# Patient Record
Sex: FEMALE | Race: WHITE | NOT HISPANIC OR LATINO | URBAN - METROPOLITAN AREA
[De-identification: names, ages, dates, MRNs, and addresses within clinical notes are randomized per-mention and may not be internally consistent; named-entity substitution may affect disease eponyms.]

---

## 2024-02-16 VITALS
OXYGEN SATURATION: 99 % | TEMPERATURE: 96 F | SYSTOLIC BLOOD PRESSURE: 123 MMHG | HEIGHT: 63 IN | WEIGHT: 176.37 LBS | DIASTOLIC BLOOD PRESSURE: 82 MMHG | RESPIRATION RATE: 8 BRPM | HEART RATE: 841 BPM

## 2024-02-16 RX ORDER — LIRAGLUTIDE 6 MG/ML
1.2 INJECTION SUBCUTANEOUS
Refills: 0 | DISCHARGE

## 2024-02-16 NOTE — ASU PATIENT PROFILE, ADULT - NSICDXPASTMEDICALHX_GEN_ALL_CORE_FT
PAST MEDICAL HISTORY:  2019 novel coronavirus disease (COVID-19)     Fibromyalgia     Pulmonary embolism 2013     PAST MEDICAL HISTORY:  2019 novel coronavirus disease (COVID-19)     Fibromyalgia     Pre-diabetes     Pulmonary embolism 2013

## 2024-02-17 ENCOUNTER — OUTPATIENT (OUTPATIENT)
Dept: OUTPATIENT SERVICES | Facility: HOSPITAL | Age: 49
LOS: 1 days | Discharge: HOME | End: 2024-02-17
Payer: COMMERCIAL

## 2024-02-17 VITALS
SYSTOLIC BLOOD PRESSURE: 133 MMHG | RESPIRATION RATE: 19 BRPM | HEART RATE: 65 BPM | OXYGEN SATURATION: 100 % | DIASTOLIC BLOOD PRESSURE: 82 MMHG

## 2024-02-17 LAB — GLUCOSE BLDC GLUCOMTR-MCNC: 101 MG/DL — HIGH (ref 70–99)

## 2024-02-17 PROCEDURE — 88305 TISSUE EXAM BY PATHOLOGIST: CPT

## 2024-02-17 PROCEDURE — 58558 HYSTEROSCOPY BIOPSY: CPT

## 2024-02-17 PROCEDURE — 88305 TISSUE EXAM BY PATHOLOGIST: CPT | Mod: 26

## 2024-02-17 PROCEDURE — 82962 GLUCOSE BLOOD TEST: CPT

## 2024-02-17 DEVICE — MYOSURE TISSUE REMOVAL DEVICE REACH: Type: IMPLANTABLE DEVICE | Status: FUNCTIONAL

## 2024-02-17 DEVICE — MYOSURE TISSUE REMOVAL DEVICE XL: Type: IMPLANTABLE DEVICE | Status: FUNCTIONAL

## 2024-02-17 RX ORDER — SIMETHICONE 80 MG/1
80 TABLET, CHEWABLE ORAL EVERY 6 HOURS
Refills: 0 | Status: DISCONTINUED | OUTPATIENT
Start: 2024-02-17 | End: 2024-02-17

## 2024-02-17 RX ORDER — KETOROLAC TROMETHAMINE 30 MG/ML
30 SYRINGE (ML) INJECTION ONCE
Refills: 0 | Status: DISCONTINUED | OUTPATIENT
Start: 2024-02-17 | End: 2024-02-17

## 2024-02-17 RX ORDER — ACETAMINOPHEN 500 MG
1000 TABLET ORAL EVERY 6 HOURS
Refills: 0 | Status: DISCONTINUED | OUTPATIENT
Start: 2024-02-17 | End: 2024-02-17

## 2024-02-17 RX ORDER — SODIUM CHLORIDE 9 MG/ML
500 INJECTION, SOLUTION INTRAVENOUS
Refills: 0 | Status: DISCONTINUED | OUTPATIENT
Start: 2024-02-17 | End: 2024-02-17

## 2024-02-17 RX ORDER — SODIUM CHLORIDE 9 MG/ML
1000 INJECTION, SOLUTION INTRAVENOUS
Refills: 0 | Status: DISCONTINUED | OUTPATIENT
Start: 2024-02-17 | End: 2024-02-17

## 2024-02-17 RX ORDER — ACETAMINOPHEN 500 MG
1000 TABLET ORAL ONCE
Refills: 0 | Status: COMPLETED | OUTPATIENT
Start: 2024-02-17 | End: 2024-02-17

## 2024-02-17 RX ORDER — IBUPROFEN 200 MG
1 TABLET ORAL
Refills: 0 | DISCHARGE

## 2024-02-17 RX ORDER — ONDANSETRON 8 MG/1
8 TABLET, FILM COATED ORAL EVERY 8 HOURS
Refills: 0 | Status: DISCONTINUED | OUTPATIENT
Start: 2024-02-17 | End: 2024-02-17

## 2024-02-17 RX ADMIN — Medication 400 MILLIGRAM(S): at 13:19

## 2024-02-17 RX ADMIN — SIMETHICONE 80 MILLIGRAM(S): 80 TABLET, CHEWABLE ORAL at 13:19

## 2024-02-17 RX ADMIN — Medication 30 MILLIGRAM(S): at 13:39

## 2024-02-17 RX ADMIN — ONDANSETRON 8 MILLIGRAM(S): 8 TABLET, FILM COATED ORAL at 13:18

## 2024-02-17 RX ADMIN — Medication 1000 MILLIGRAM(S): at 13:32

## 2024-02-17 NOTE — ASU DISCHARGE PLAN (ADULT/PEDIATRIC) - NS MD DC FALL RISK RISK
For information on Fall & Injury Prevention, visit: [email or web address] OR  [email or web address] OR  https://www.cdc.gov/steadi/patient.html

## 2024-02-17 NOTE — ASU DISCHARGE PLAN (ADULT/PEDIATRIC) - CARE PROVIDER_API CALL
Jackie Arauz  Obstetrics and Gynecology  [address], [address]  [place] [address]  Phone: [phone]  Fax: [phone]  Follow Up Time:

## 2024-02-17 NOTE — PRE-ANESTHESIA EVALUATION ADULT - NSANTHPMHFT_GEN_ALL_CORE
stopped Victoza 2 days ago. POCUS gastric revealed no fluid or solid in supine position and no solid in RLD position

## 2024-02-20 LAB — SURGICAL PATHOLOGY STUDY: SIGNIFICANT CHANGE UP

## 2025-03-26 NOTE — ASU DISCHARGE PLAN (ADULT/PEDIATRIC) - PLEASE INDICATE TEMPERATURE IN FAHRENHEIT OR CELSIUS
Encounter Date: 3/25/2025       History     Chief Complaint   Patient presents with    Cough     Pt c/o cough and pain with deep breathing since Last Friday. Hx of Asthma. No distress      Patient is an 9-year-old female with a past medical history of asthma who presents to the emergency department with complaints of cough, nasal congestion x5 days.  Patient is present with her older sister who has similar symptoms.  She has been taking over-the-counter Tylenol Motrin to alleviate her symptoms.  She denies fever, chills, chest pain, shortness of breath, nausea, vomiting or diarrhea.        Review of patient's allergies indicates:  No Known Allergies  History reviewed. No pertinent past medical history.  History reviewed. No pertinent surgical history.  Family History   Problem Relation Name Age of Onset    Asthma Mother Alexia Burns         Copied from mother's history at birth     Social History[1]  Review of Systems   Constitutional:  Negative for fever.   HENT:  Positive for congestion. Negative for sore throat.    Respiratory:  Positive for cough. Negative for shortness of breath.    Cardiovascular:  Negative for chest pain.   Gastrointestinal:  Negative for nausea.   Genitourinary:  Negative for dysuria.   Musculoskeletal:  Negative for back pain.   Skin:  Negative for rash.   Neurological:  Negative for weakness.   Hematological:  Does not bruise/bleed easily.       Physical Exam     Initial Vitals [03/25/25 1809]   BP Pulse Resp Temp SpO2   (!) 134/79 (!) 114 20 99.6 °F (37.6 °C) 98 %      MAP       --         Physical Exam    Nursing note and vitals reviewed.  Constitutional: She appears well-developed and well-nourished. She is not diaphoretic. She does not appear ill. No distress.   HENT:   Head: Normocephalic and atraumatic. There is normal jaw occlusion.   Right Ear: Tympanic membrane, external ear, pinna and canal normal.   Left Ear: Tympanic membrane, external ear, pinna and canal normal.   Nose:  Rhinorrhea and congestion present. No nasal discharge. Mouth/Throat: Mucous membranes are moist. Tongue is normal. Dentition is normal. No dental caries. Pharynx erythema present. No oropharyngeal exudate or pharynx swelling. Tonsils are 0 on the right. Tonsils are 0 on the left. No tonsillar exudate.   Postnasal drip noted.  No trismus.  Normal jaw occlusion.  No evidence of tonsillar abscess.  No submandibular sublingual erythema or swelling. Oral airway is symmetric.  Uvula is midline without erythema or swelling.  Patient tolerating oral secretions.  No muffled/hoarse voice.     Eyes: Conjunctivae, EOM and lids are normal. Visual tracking is normal. Pupils are equal, round, and reactive to light. Right eye exhibits no discharge. Left eye exhibits no discharge.   Neck: Neck supple. No tracheal tenderness present. No tenderness is present. No Brudzinski's sign noted.   Normal range of motion.   Full passive range of motion without pain.     Cardiovascular:  Normal rate, regular rhythm, S1 normal and S2 normal.        Pulses are strong.    No murmur heard.  Pulmonary/Chest: Effort normal and breath sounds normal. No accessory muscle usage, nasal flaring or stridor. No respiratory distress. Air movement is not decreased. She has no wheezes. She has no rhonchi. She has no rales. She exhibits no retraction.   Abdominal: Abdomen is soft. Bowel sounds are normal. She exhibits no distension. There is no abdominal tenderness. There is no rigidity, no rebound and no guarding.   Musculoskeletal:         General: No tenderness, deformity, signs of injury or edema.      Cervical back: Normal, full passive range of motion without pain, normal range of motion and neck supple. No rigidity. No pain with movement, spinous process tenderness or muscular tenderness. Normal range of motion.      Thoracic back: Normal.      Lumbar back: Normal.      Right lower leg: Normal.      Left lower leg: Normal.     Lymphadenopathy: No anterior  cervical adenopathy, posterior cervical adenopathy, anterior occipital adenopathy or posterior occipital adenopathy. No occipital adenopathy is present.     She has no cervical adenopathy.   Neurological: She is alert and oriented for age.   Skin: Skin is warm and dry. Capillary refill takes less than 2 seconds. No rash noted.   Psychiatric: She has a normal mood and affect. Her speech is normal and behavior is normal.         ED Course   Procedures  Labs Reviewed   POCT URINE PREGNANCY       Result Value    POC Preg Test, Ur Negative       Acceptable Yes            Imaging Results    None          Medications - No data to display  Medical Decision Making  Patient is a well-appearing 9 y.o. female. Tolerating PO, non-toxic appearing. The patient remained comfortable and stable during their visit in the ED.  Details of ED course documented in ED workup.     Differential Diagnosis is considered, but is not limited to: COVID, Flu, Strep throat, Viral URI, PTA, RPA, pneumonia, acute otitis media, otitis externa, mastoiditis, sinusitis, viral gastroenteritis, measles, roseola.  Also considered but less likely:  PTA/RPA: no hot potato voice, no uvular deviation, no pain with passive neck flexion.  Esophageal rupture: No history of dysphagia.  Unlikely deep space infection/Mata's, no submandibular or sublingual erythema or swelling.  Low suspicion for CNS infection/meningitis: no pain with passive neck flexion, no neck rigidity/stiffness, no neck tenderness, negative Brudzinski.  Unlikely EBV as no splenomegaly, no LUQ abdominal tenderness or pain.      All historical, clinical, and laboratory findings reviewed. Patient with constellation of symptoms most consistent with viral URI. There are no concerning features on physical exam to suggest an emergent or life threatening condition or an invasive bacterial infection, including, but not limited to the conditions noted above. No further intervention is  indicated at this time. The patient is at low risk for an emergent/life threatening medical condition at this time, and I am of the belief that that it is safe to discharge the patient from the emergency department.     Patient/family instructed to follow up with PCP/Pediatrician in 1-2 days for recheck of today's complaints. Patient/family has been counseled regarding the need for follow-up as well as the indications to return to the emergency room should new, worsening, continued or worrisome developments. Discharge and follow-up instructions discussed with the patient/family who expressed understanding and willingness to comply with recommendations. Patient discharged from the emergency department in stable condition, in no acute distress.  I discussed with the patient/family the diagnosis, treatment plan, indications for return to the emergency department, and for expected follow-up. The patient/family verbalized an understanding. The patient/family is asked if there are any questions or concerns. We discuss the case, until all issues are addressed to the patient/family's satisfaction. Patient/family understands and is agreeable to the plan.     Risk  OTC drugs.  Prescription drug management.                                      Clinical Impression:  Final diagnoses:  [J06.9] Viral URI with cough (Primary)          ED Disposition Condition    Discharge Stable          ED Prescriptions       Medication Sig Dispense Start Date End Date Auth. Provider    loratadine (CHILDREN'S CLARITIN) 5 mg chewable tablet Take 1 tablet (5 mg total) by mouth once daily. 30 tablet 3/25/2025 3/25/2026 Alexa Adhikari PA-C    fluticasone propionate (FLONASE) 50 mcg/actuation nasal spray 1 spray (50 mcg total) by Each Nostril route 2 (two) times daily as needed. 15 g 3/25/2025 4/1/2025 Alexa Adhikari PA-C    albuterol (PROVENTIL/VENTOLIN HFA) 90 mcg/actuation inhaler Inhale 2 puffs into the lungs every 6 (six) hours as needed for  Wheezing or Shortness of Breath. 8.5 g 3/25/2025 3/25/2026 Alexa Adhikari PA-C    albuterol sulfate 2.5 mg/0.5 mL Nebu Take 2.5 mg by nebulization every 4 (four) hours as needed. Rescue 30 each 3/25/2025 4/24/2025 Alexa Adhikari PA-C          Follow-up Information       Follow up With Specialties Details Why Contact Info    Cody Vela MD Neonatology, Pediatrics   [address]  [facility] [address] [phone]      [facility] ED Emergency Medicine Go to  for new or worsening symptoms [address] [facility] [address] [phone]               [1]         Alexa Adhikari PA-C  03/25/25 2126     101

## (undated) DEVICE — PRESSURE INFUSOR BAG 3000ML

## (undated) DEVICE — WARMING BLANKET UPPER ADULT

## (undated) DEVICE — MYOSURE SCOPE SEAL

## (undated) DEVICE — SOL IRR BAG NS 0.9% 3000ML

## (undated) DEVICE — PACK D&C

## (undated) DEVICE — MYOSURE CANISTER AQUILEX KIT

## (undated) DEVICE — ELCTR PLASMA LOOP MEDIUM ANGLED 24FR 12-30 DEG

## (undated) DEVICE — VENODYNE/SCD SLEEVE CALF MEDIUM

## (undated) DEVICE — GLV 8 PROTEXIS (WHITE)

## (undated) DEVICE — DRSG TELFA 3 X 8

## (undated) DEVICE — GOWN TRIMAX XXL

## (undated) DEVICE — DRAPE IRRIGATION POUCH 19X23"

## (undated) DEVICE — TUBING AQUILEX OUTFLOW

## (undated) DEVICE — DRAPE TOWEL BLUE 17" X 24"

## (undated) DEVICE — TUBING TUR 2 PRONG

## (undated) DEVICE — TUBING AQUILEX INFLOW